# Patient Record
Sex: FEMALE | Race: WHITE | Employment: OTHER
[De-identification: names, ages, dates, MRNs, and addresses within clinical notes are randomized per-mention and may not be internally consistent; named-entity substitution may affect disease eponyms.]

---

## 2023-11-27 ENCOUNTER — HOSPITAL ENCOUNTER (EMERGENCY)
Facility: HOSPITAL | Age: 78
Discharge: HOME | End: 2023-11-27
Attending: STUDENT IN AN ORGANIZED HEALTH CARE EDUCATION/TRAINING PROGRAM
Payer: MEDICARE

## 2023-11-27 ENCOUNTER — APPOINTMENT (OUTPATIENT)
Facility: HOSPITAL | Age: 78
End: 2023-11-27
Payer: MEDICARE

## 2023-11-27 VITALS
RESPIRATION RATE: 16 BRPM | OXYGEN SATURATION: 98 % | TEMPERATURE: 98.6 F | SYSTOLIC BLOOD PRESSURE: 131 MMHG | DIASTOLIC BLOOD PRESSURE: 65 MMHG | WEIGHT: 190 LBS | BODY MASS INDEX: 30.53 KG/M2 | HEIGHT: 66 IN | HEART RATE: 67 BPM

## 2023-11-27 DIAGNOSIS — S02.2XXA CLOSED FRACTURE OF NASAL BONE, INITIAL ENCOUNTER: ICD-10-CM
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
## 2023-11-27 PROCEDURE — 6360000002 HC RX W HCPCS: Performed by: STUDENT IN AN ORGANIZED HEALTH CARE EDUCATION/TRAINING PROGRAM

## 2023-11-27 PROCEDURE — 70486 CT MAXILLOFACIAL W/O DYE: CPT

## 2023-11-27 PROCEDURE — 70450 CT HEAD/BRAIN W/O DYE: CPT

## 2023-11-27 PROCEDURE — 73130 X-RAY EXAM OF HAND: CPT

## 2023-11-27 PROCEDURE — 90714 TD VACC NO PRESV 7 YRS+ IM: CPT | Performed by: STUDENT IN AN ORGANIZED HEALTH CARE EDUCATION/TRAINING PROGRAM

## 2023-11-27 PROCEDURE — 90471 IMMUNIZATION ADMIN: CPT | Performed by: STUDENT IN AN ORGANIZED HEALTH CARE EDUCATION/TRAINING PROGRAM

## 2023-11-27 PROCEDURE — 99284 EMERGENCY DEPT VISIT MOD MDM: CPT

## 2023-11-27 PROCEDURE — 72125 CT NECK SPINE W/O DYE: CPT

## 2023-11-27 PROCEDURE — 6370000000 HC RX 637 (ALT 250 FOR IP): Performed by: STUDENT IN AN ORGANIZED HEALTH CARE EDUCATION/TRAINING PROGRAM

## 2023-11-27 RX ORDER — ACETAMINOPHEN 500 MG
1000 TABLET ORAL
Status: COMPLETED | OUTPATIENT
Start: 2023-11-27 | End: 2023-11-27

## 2023-11-27 RX ORDER — TETANUS AND DIPHTHERIA TOXOIDS ADSORBED 2; 2 [LF]/.5ML; [LF]/.5ML
0.5 INJECTION INTRAMUSCULAR
Status: COMPLETED | OUTPATIENT
Start: 2023-11-27 | End: 2023-11-27

## 2023-11-27 RX ORDER — ASPIRIN 81 MG/1
81 TABLET ORAL DAILY
COMMUNITY

## 2023-11-27 RX ADMIN — TETANUS AND DIPHTHERIA TOXOIDS ADSORBED 0.5 ML: 2; 2 INJECTION INTRAMUSCULAR at 11:42

## 2023-11-27 RX ADMIN — Medication 3 ML: at 11:57

## 2023-11-27 RX ADMIN — ACETAMINOPHEN 1000 MG: 500 TABLET ORAL at 11:29

## 2023-11-27 ASSESSMENT — PAIN DESCRIPTION - DESCRIPTORS: DESCRIPTORS: THROBBING

## 2023-11-27 ASSESSMENT — PAIN SCALES - GENERAL: PAINLEVEL_OUTOF10: 1

## 2023-11-27 ASSESSMENT — PAIN DESCRIPTION - ORIENTATION: ORIENTATION: RIGHT

## 2023-11-27 ASSESSMENT — PAIN DESCRIPTION - LOCATION: LOCATION: FACE

## 2023-11-27 ASSESSMENT — LIFESTYLE VARIABLES: HOW OFTEN DO YOU HAVE A DRINK CONTAINING ALCOHOL: PATIENT DECLINED

## 2023-11-27 ASSESSMENT — PAIN - FUNCTIONAL ASSESSMENT: PAIN_FUNCTIONAL_ASSESSMENT: PREVENTS OR INTERFERES SOME ACTIVE ACTIVITIES AND ADLS

## 2023-11-27 NOTE — DISCHARGE INSTRUCTIONS
You are seen today for evaluation of fall. You were found to have a nasal fracture  Follow up with ENT in the next week  Don't blow your nose! Apply ice to reduce swelling and pain  Alternate taking Tylenol and Motrin to help with inflammation and pain      For pain management, both Tylenol and ibuprofen (motrin) can be taken. They have a different chemical composition and may give more relief together than can be provided using either alone. How to alternate Ibuprofen and Tylenol:  ? With food, You will take a dose of pain medication every three hours. ? Start by taking 650 mg of Tylenol   ? 3 hours later take 600 mg of Motrin   ? 3 hours after taking the Motrin take 650 mg of Tylenol  ? 3 hours after that take 600 mg of Motrin. ? You can continue to alternate Ibuprofen and Tylenol, up to the maximum doses of each medicine, but do not exceed the maximum dose of each. ? Be sure to maintain proper dosing intervals between successive doses of the same medication (at least 4 hours)    Do not take more than 4,000 mg of Tylenol or 3,200 mg of Motrin in a 24-hour period!       Thank you for letting us take care of you, hope you feel better soon,  Aminah Chacko MD

## 2023-11-27 NOTE — ED TRIAGE NOTES
Patient presents ambulatory to treatment area; declined wheelchair. Patient states she got out of bed quickly this morning with \"fuzzy\" socks on. Patient states he feet went out from under her and she \"faceplanted\" on the hardwood floor. Denies LOC. Right sided facial bruising, epistaxis, lip lacerations, and three missing top teeth noted.  Patient takes aspirin, but denies anticoagulants

## 2023-11-27 NOTE — ED NOTES
Pt discharged to home at this time. All discharge instructions provided to patient. All questions answered. Pt verbalized understanding of all instructions. No distress noted at time of discharge.       Nova Hawley RN  11/27/23 6275

## 2023-11-27 NOTE — ED NOTES
LET applied to lip as ordered. MD working on discharged paperwork.       Karla Reynoso, RN  11/27/23 1200

## 2023-11-27 NOTE — ED PROVIDER NOTES
closed  ENT referral provided, patient advised to not blow her nose, pain management and ice recommended  Patient's tetanus updated in the emergency department  Patient with pain and bruising in right hand, x-ray negative for fracture, no snuffbox tenderness, do not suspect occult fracture. Advised ice and elevation and reevaluation by primary care should pain continue in the next week  Patient given Tylenol for pain. Let gel applied for pain control and to help with oozing bleeding of upper lip  On reevaluation patient well-appearing, stable for discharge home    Amount and/or Complexity of Data Reviewed  Radiology: ordered. Decision-making details documented in ED Course. Risk  OTC drugs. Prescription drug management. REASSESSMENT     ED Course as of 11/27/23 1159   Mon Nov 27, 2023   1131 XR HAND RIGHT (MIN 3 VIEWS) [SL]   1131 CT CERVICAL SPINE WO CONTRAST [SL]   3255 CT HEAD WO CONTRAST [SL]   0585 CT MAXILLOFACIAL WO CONTRAST  depressed fracture of the nasal pyramid on the right [SL]      ED Course User Index  [SL] Idris Read MD           CONSULTS:  None    PROCEDURES:  Unless otherwise noted below, none     Procedures          FINAL IMPRESSION      1. Fall, initial encounter    2.  Closed fracture of nasal bone, initial encounter            DISPOSITION/PLAN   DISPOSITION Decision To Discharge 11/27/2023 11:53:59 AM      PATIENT REFERRED TO:  Paul Brewer [facility] [address]  [phone]    Call in 2 days      [facility]  [address]  [phone]  Call in 2 days        DISCHARGE MEDICATIONS:  New Prescriptions    No medications on file         (Please note that portions of this note were completed with a voice recognition program.  Efforts were made to edit the dictations but occasionally words are mis-transcribed.)    Idris Read MD (electronically signed)  Emergency Attending Physician              Irene Becerra